# Patient Record
Sex: FEMALE | URBAN - METROPOLITAN AREA
[De-identification: names, ages, dates, MRNs, and addresses within clinical notes are randomized per-mention and may not be internally consistent; named-entity substitution may affect disease eponyms.]

---

## 2022-03-11 ENCOUNTER — APPOINTMENT (OUTPATIENT)
Dept: URBAN - METROPOLITAN AREA CLINIC 270 | Age: 67
Setting detail: DERMATOLOGY
End: 2022-03-27

## 2022-03-11 PROBLEM — L24.89 IRRITANT CONTACT DERMATITIS DUE TO OTHER AGENTS: Status: ACTIVE | Noted: 2022-03-11

## 2022-03-11 PROCEDURE — OTHER ADDITIONAL NOTES: OTHER

## 2022-03-11 PROCEDURE — OTHER COUNSELING: TOPICAL STEROIDS: OTHER

## 2022-03-11 PROCEDURE — OTHER PRESCRIPTION: OTHER

## 2022-03-11 PROCEDURE — 99203 OFFICE O/P NEW LOW 30 MIN: CPT

## 2022-03-11 RX ORDER — CLOBETASOL PROPIONATE 0.5 MG/G
CREAM TOPICAL
Qty: 45 | Refills: 0 | Status: ERX | COMMUNITY
Start: 2022-03-11

## 2022-03-11 NOTE — HPI: RASH
Additional History: Knee surgery January 5th, Dr. Carrion Alvarado. Adv to use [employer] Additional History: Knee surgery January 5th, Dr. Carrion Goodrich. Adv to use [employer]

## 2022-03-11 NOTE — PROCEDURE: ADDITIONAL NOTES
Additional Notes: Diagnosis SKINTED.\\nUnknown ICD.
Render Risk Assessment In Note?: yes
Detail Level: Simple
Patient Management Risk Assessment: Moderate

## 2022-04-13 ENCOUNTER — APPOINTMENT (OUTPATIENT)
Dept: URBAN - METROPOLITAN AREA CLINIC 270 | Age: 67
Setting detail: DERMATOLOGY
End: 2022-04-15

## 2022-04-13 PROBLEM — L24.89 IRRITANT CONTACT DERMATITIS DUE TO OTHER AGENTS: Status: ACTIVE | Noted: 2022-04-13

## 2022-04-13 PROCEDURE — OTHER ADDITIONAL NOTES: OTHER

## 2022-04-13 PROCEDURE — OTHER COUNSELING: TOPICAL STEROIDS: OTHER

## 2022-04-13 PROCEDURE — 99213 OFFICE O/P EST LOW 20 MIN: CPT

## 2022-04-13 NOTE — PROCEDURE: ADDITIONAL NOTES
Detail Level: Simple
Patient Management Risk Assessment: Moderate
Additional Notes: Diagnosis SKINTED.\\nUnknown ICD.\\n\\n\\nPt showed pictures, rash is slowly improving. Pt disagrees and believes it is the same. She still reports that it is asymptotic. \\nDenies any other concerns. \\nIs following up with [facility]TH[facility] in the next month.
Render Risk Assessment In Note?: yes